# Patient Record
Sex: FEMALE | Race: WHITE | Employment: UNEMPLOYED | URBAN - METROPOLITAN AREA
[De-identification: names, ages, dates, MRNs, and addresses within clinical notes are randomized per-mention and may not be internally consistent; named-entity substitution may affect disease eponyms.]

---

## 2017-01-09 ENCOUNTER — TELEPHONE (OUTPATIENT)
Dept: SURGERY | Age: 77
End: 2017-01-09

## 2017-01-09 PROBLEM — T82.590A DIALYSIS AV FISTULA MALFUNCTION (HCC): Status: ACTIVE | Noted: 2017-01-09

## 2017-01-20 ENCOUNTER — OFFICE VISIT (OUTPATIENT)
Dept: SURGERY | Age: 77
End: 2017-01-20

## 2017-01-20 VITALS
SYSTOLIC BLOOD PRESSURE: 106 MMHG | DIASTOLIC BLOOD PRESSURE: 57 MMHG | HEIGHT: 62 IN | HEART RATE: 69 BPM | BODY MASS INDEX: 20.24 KG/M2 | WEIGHT: 110 LBS

## 2017-01-20 DIAGNOSIS — T82.7XXD VASCULAR GRAFT INFECTION, SUBSEQUENT ENCOUNTER: Primary | ICD-10-CM

## 2017-01-20 PROCEDURE — 99024 POSTOP FOLLOW-UP VISIT: CPT | Performed by: NURSE PRACTITIONER

## 2017-01-23 ENCOUNTER — TELEPHONE (OUTPATIENT)
Dept: SURGERY | Age: 77
End: 2017-01-23

## 2017-01-27 ENCOUNTER — OFFICE VISIT (OUTPATIENT)
Dept: SURGERY | Age: 77
End: 2017-01-27

## 2017-01-27 VITALS
SYSTOLIC BLOOD PRESSURE: 116 MMHG | HEIGHT: 62 IN | TEMPERATURE: 97.3 F | BODY MASS INDEX: 19.69 KG/M2 | HEART RATE: 77 BPM | WEIGHT: 107 LBS | DIASTOLIC BLOOD PRESSURE: 58 MMHG

## 2017-01-27 DIAGNOSIS — N18.6 END-STAGE RENAL DISEASE ON HEMODIALYSIS (HCC): Chronic | ICD-10-CM

## 2017-01-27 DIAGNOSIS — T82.7XXD VASCULAR GRAFT INFECTION, SUBSEQUENT ENCOUNTER: Primary | ICD-10-CM

## 2017-01-27 DIAGNOSIS — Z99.2 END-STAGE RENAL DISEASE ON HEMODIALYSIS (HCC): Chronic | ICD-10-CM

## 2017-01-27 PROCEDURE — 99024 POSTOP FOLLOW-UP VISIT: CPT | Performed by: SURGERY

## 2017-01-27 ASSESSMENT — ENCOUNTER SYMPTOMS
GASTROINTESTINAL NEGATIVE: 1
RESPIRATORY NEGATIVE: 1
ALLERGIC/IMMUNOLOGIC NEGATIVE: 1

## 2017-02-03 ENCOUNTER — OFFICE VISIT (OUTPATIENT)
Dept: SURGERY | Age: 77
End: 2017-02-03

## 2017-02-03 VITALS
HEART RATE: 79 BPM | HEIGHT: 62 IN | SYSTOLIC BLOOD PRESSURE: 128 MMHG | BODY MASS INDEX: 20.06 KG/M2 | WEIGHT: 109 LBS | DIASTOLIC BLOOD PRESSURE: 58 MMHG

## 2017-02-03 DIAGNOSIS — T82.7XXD VASCULAR GRAFT INFECTION, SUBSEQUENT ENCOUNTER: Primary | ICD-10-CM

## 2017-02-03 PROCEDURE — 99024 POSTOP FOLLOW-UP VISIT: CPT | Performed by: NURSE PRACTITIONER

## 2017-02-08 ENCOUNTER — TELEPHONE (OUTPATIENT)
Dept: SURGERY | Age: 77
End: 2017-02-08

## 2017-02-10 ENCOUNTER — OFFICE VISIT (OUTPATIENT)
Dept: SURGERY | Age: 77
End: 2017-02-10

## 2017-02-10 VITALS
HEIGHT: 62 IN | HEART RATE: 68 BPM | WEIGHT: 108 LBS | BODY MASS INDEX: 19.88 KG/M2 | SYSTOLIC BLOOD PRESSURE: 108 MMHG | DIASTOLIC BLOOD PRESSURE: 62 MMHG

## 2017-02-10 DIAGNOSIS — T82.7XXD VASCULAR GRAFT INFECTION, SUBSEQUENT ENCOUNTER: Primary | ICD-10-CM

## 2017-02-10 PROCEDURE — 99024 POSTOP FOLLOW-UP VISIT: CPT | Performed by: SURGERY

## 2017-02-10 ASSESSMENT — ENCOUNTER SYMPTOMS
ALLERGIC/IMMUNOLOGIC NEGATIVE: 1
GASTROINTESTINAL NEGATIVE: 1
SHORTNESS OF BREATH: 1

## 2017-02-20 ENCOUNTER — OFFICE VISIT (OUTPATIENT)
Dept: SURGERY | Age: 77
End: 2017-02-20

## 2017-02-20 VITALS
WEIGHT: 109 LBS | SYSTOLIC BLOOD PRESSURE: 122 MMHG | BODY MASS INDEX: 20.06 KG/M2 | DIASTOLIC BLOOD PRESSURE: 68 MMHG | HEIGHT: 62 IN | HEART RATE: 78 BPM

## 2017-02-20 DIAGNOSIS — N18.6 END-STAGE RENAL DISEASE ON HEMODIALYSIS (HCC): Chronic | ICD-10-CM

## 2017-02-20 DIAGNOSIS — Z99.2 END-STAGE RENAL DISEASE ON HEMODIALYSIS (HCC): Chronic | ICD-10-CM

## 2017-02-20 DIAGNOSIS — T82.9XXA COMPLICATION OF VASCULAR ACCESS FOR DIALYSIS, INITIAL ENCOUNTER: Primary | ICD-10-CM

## 2017-02-20 PROCEDURE — 99024 POSTOP FOLLOW-UP VISIT: CPT | Performed by: NURSE PRACTITIONER

## 2017-02-27 ENCOUNTER — TELEPHONE (OUTPATIENT)
Dept: SURGERY | Age: 77
End: 2017-02-27

## 2017-02-28 ENCOUNTER — OFFICE VISIT (OUTPATIENT)
Dept: SURGERY | Age: 77
End: 2017-02-28

## 2017-02-28 VITALS
BODY MASS INDEX: 19.94 KG/M2 | HEART RATE: 72 BPM | DIASTOLIC BLOOD PRESSURE: 66 MMHG | SYSTOLIC BLOOD PRESSURE: 145 MMHG | WEIGHT: 109 LBS

## 2017-02-28 DIAGNOSIS — N18.6 END-STAGE RENAL DISEASE ON HEMODIALYSIS (HCC): Primary | Chronic | ICD-10-CM

## 2017-02-28 DIAGNOSIS — T82.9XXD COMPLICATION OF VASCULAR ACCESS FOR DIALYSIS, SUBSEQUENT ENCOUNTER: ICD-10-CM

## 2017-02-28 DIAGNOSIS — Z99.2 END-STAGE RENAL DISEASE ON HEMODIALYSIS (HCC): Primary | Chronic | ICD-10-CM

## 2017-02-28 PROCEDURE — 99024 POSTOP FOLLOW-UP VISIT: CPT | Performed by: SURGERY

## 2017-02-28 ASSESSMENT — ENCOUNTER SYMPTOMS
GASTROINTESTINAL NEGATIVE: 1
RESPIRATORY NEGATIVE: 1
ALLERGIC/IMMUNOLOGIC NEGATIVE: 1
BACK PAIN: 0

## 2017-03-01 ENCOUNTER — TELEPHONE (OUTPATIENT)
Dept: SURGERY | Age: 77
End: 2017-03-01

## 2017-03-13 ENCOUNTER — PROCEDURE VISIT (OUTPATIENT)
Dept: SURGERY | Age: 77
End: 2017-03-13

## 2017-03-13 DIAGNOSIS — T82.9XXD COMPLICATION OF VASCULAR ACCESS FOR DIALYSIS, SUBSEQUENT ENCOUNTER: ICD-10-CM

## 2017-03-13 DIAGNOSIS — Z99.2 END-STAGE RENAL DISEASE ON HEMODIALYSIS (HCC): Chronic | ICD-10-CM

## 2017-03-13 DIAGNOSIS — N18.6 END-STAGE RENAL DISEASE ON HEMODIALYSIS (HCC): Chronic | ICD-10-CM

## 2017-03-13 PROCEDURE — 93990 DOPPLER FLOW TESTING: CPT | Performed by: SURGERY

## 2017-03-14 ENCOUNTER — OFFICE VISIT (OUTPATIENT)
Dept: SURGERY | Age: 77
End: 2017-03-14

## 2017-03-14 VITALS
HEART RATE: 63 BPM | WEIGHT: 108 LBS | SYSTOLIC BLOOD PRESSURE: 135 MMHG | HEIGHT: 62 IN | DIASTOLIC BLOOD PRESSURE: 64 MMHG | BODY MASS INDEX: 19.88 KG/M2

## 2017-03-14 DIAGNOSIS — N18.6 ESRD (END STAGE RENAL DISEASE) (HCC): Primary | ICD-10-CM

## 2017-03-14 PROCEDURE — 99024 POSTOP FOLLOW-UP VISIT: CPT | Performed by: SURGERY

## 2017-03-14 ASSESSMENT — ENCOUNTER SYMPTOMS
ALLERGIC/IMMUNOLOGIC NEGATIVE: 1
BACK PAIN: 0
GASTROINTESTINAL NEGATIVE: 1

## 2018-01-02 ENCOUNTER — OFFICE VISIT (OUTPATIENT)
Dept: SURGERY | Age: 78
End: 2018-01-02

## 2018-01-02 ENCOUNTER — PROCEDURE VISIT (OUTPATIENT)
Dept: SURGERY | Age: 78
End: 2018-01-02

## 2018-01-02 VITALS
HEART RATE: 59 BPM | HEIGHT: 62 IN | SYSTOLIC BLOOD PRESSURE: 96 MMHG | WEIGHT: 108 LBS | BODY MASS INDEX: 19.88 KG/M2 | DIASTOLIC BLOOD PRESSURE: 50 MMHG

## 2018-01-02 DIAGNOSIS — N18.6 END-STAGE RENAL DISEASE ON HEMODIALYSIS (HCC): Primary | Chronic | ICD-10-CM

## 2018-01-02 DIAGNOSIS — R41.82 ALTERED MENTAL STATUS, UNSPECIFIED ALTERED MENTAL STATUS TYPE: ICD-10-CM

## 2018-01-02 DIAGNOSIS — I48.0 PAF (PAROXYSMAL ATRIAL FIBRILLATION) (HCC): ICD-10-CM

## 2018-01-02 DIAGNOSIS — I27.20 PULMONARY HYPERTENSION (HCC): Chronic | ICD-10-CM

## 2018-01-02 DIAGNOSIS — Z79.01 WARFARIN ANTICOAGULATION: Chronic | ICD-10-CM

## 2018-01-02 DIAGNOSIS — I48.92 ATRIAL FLUTTER, UNSPECIFIED TYPE (HCC): ICD-10-CM

## 2018-01-02 DIAGNOSIS — Z99.2 END-STAGE RENAL DISEASE ON HEMODIALYSIS (HCC): Primary | Chronic | ICD-10-CM

## 2018-01-02 DIAGNOSIS — E78.5 HYPERLIPIDEMIA, UNSPECIFIED HYPERLIPIDEMIA TYPE: ICD-10-CM

## 2018-01-02 DIAGNOSIS — Z86.79 HISTORY OF ATRIAL FIBRILLATION: Chronic | ICD-10-CM

## 2018-01-02 DIAGNOSIS — I10 ESSENTIAL HYPERTENSION: ICD-10-CM

## 2018-01-02 PROCEDURE — G0365 VESSEL MAPPING HEMO ACCESS: HCPCS | Performed by: SURGERY

## 2018-01-02 PROCEDURE — 99214 OFFICE O/P EST MOD 30 MIN: CPT | Performed by: SURGERY

## 2018-01-02 ASSESSMENT — ENCOUNTER SYMPTOMS
EYE ITCHING: 0
ALLERGIC/IMMUNOLOGIC NEGATIVE: 1
BACK PAIN: 0
EYE REDNESS: 0
PHOTOPHOBIA: 0
EYE DISCHARGE: 0
GASTROINTESTINAL NEGATIVE: 1
EYE PAIN: 0

## 2018-01-02 NOTE — PROGRESS NOTES
Daily Progress Note   Gabriela Ballard MD      1/2/2018    Chief Complaint   Patient presents with    Other     Hemodyalysis graft on right arm not working. Patient had left side cath done today 01-2. HISTORY OF PRESENT ILLNESS:                The patient is a 65 y.o. female who presents with concerns of non-functiong Right Upper Extremity Graft. Patient indicates she had been advised she has a narrowing to the Rt upper Extremity great. Patient has had a new CVC placed today of the Left side, with the Rt side CVC removed, she had this performed at the [address]. Patient is Left handed. Patient states since March of 2017, the access Center has mad an attempt to open, she mentions this had been performed. Patient has never had vascular access through the groins. Patient does continue use of Oxygen daily. Past Medical History:   Diagnosis Date    Anemia     Atrial fibrillation ([address]     Chronic kidney disease     Clostridium difficile infection 10/2/14, 4/3/15    Dialysis patient ([address]     End-stage renal disease on hemodialysis ([address]     GERD (gastroesophageal reflux disease)     Hemodialysis patient ([address]     3 times a week for 4 hours    Hyperlipidemia     Hypertension     Hypothyroidism     Ulcer ([address]     bleeding       Past Surgical History:   Procedure Laterality Date    APPENDECTOMY      BACK SURGERY      DIALYSIS FISTULA CREATION  1/4/11    Right arm radiocephalic av fistula         DIALYSIS FISTULA CREATION  12/4/12    Right arm brachial/basilic vein transposition     DIALYSIS FISTULA CREATION Right 12/30/14    right upper arm loop graft fistula, brachial basilic using acuseal graft choice    HYSTERECTOMY      OTHER SURGICAL HISTORY Right 9/3/15    Revision of right upper arm dialysis graft; Removal of infected graft and replacement with 6 mm Standard wall stretch Goretex graft - brachial axillary;  Left internal jugular tunneled dialysis catheter; fluoroscopy and ultrasound guidance     URETER SURGERY         Social History     Social History    Marital status:      Spouse name: N/A    Number of children: N/A    Years of education: N/A     Occupational History    Not on file. Social History Main Topics    Smoking status: Never Smoker    Smokeless tobacco: Never Used    Alcohol use No    Drug use: No    Sexual activity: Yes     Partners: Male     Other Topics Concern    Not on file     Social History Narrative    No narrative on file       No family history on file. Current Outpatient Prescriptions:     Wound Dressings (GRX HYDROGEL GAUZE 4X4) PADS, Apply 1 Package topically daily, Disp: 2 each, Rfl: 2    ceFAZolin (ANCEF) 1 G injection, 2g IV on HD for 6 weeks  given  by dialysis center, Disp: 2000 mg, Rfl: 0    oxyCODONE-acetaminophen (PERCOCET) 5-325 MG per tablet, Take 1 tablet by mouth every 8 hours as needed for Pain ., Disp: 20 tablet, Rfl: 0    amiodarone (CORDARONE) 200 MG tablet, Take 200 mg by mouth daily, Disp: , Rfl:     warfarin (COUMADIN) 4 MG tablet, Take 0.5 tablets by mouth daily, Disp: 30 tablet, Rfl: 3    Magic Mouthwash (MIRACLE MOUTHWASH), Swish and spit 5 mLs 4 times daily as needed for Irritation, Disp: 120 mL, Rfl: 0    lisinopril (PRINIVIL;ZESTRIL) 20 MG tablet, Take 0.5 tablets by mouth daily, Disp: 30 tablet, Rfl: 3    hydrocortisone-pramoxine (PROCTOFOAM HC) 1-1 % rectal foam, Place rectally 2 times daily. , Disp: 10 g, Rfl: 0    acetaminophen 650 MG TABS, Take 650 mg by mouth every 4 hours as needed, Disp: 120 tablet, Rfl: 3    FLUoxetine (PROZAC) 10 MG tablet, Take 10 mg by mouth daily, Disp: , Rfl:     epoetin elvin (EPOGEN;PROCRIT) 05839 UNIT/ML injection, Inject 10,000 Units into the skin three times a week, Disp: , Rfl:     levothyroxine (SYNTHROID) 112 MCG tablet, Take 1 tablet by mouth every morning (before breakfast). , Disp: 30 tablet, Rfl: 3    sevelamer (RENVELA) 800 MG tablet, Take 2 tablets by mouth 3 times daily With meals, Disp: , Rfl:     ergocalciferol (ERGOCALCIFEROL) 03877 UNITS capsule, Take 50,000 Units by mouth once a week SATURDAYS, Disp: , Rfl:     ferrous sulfate 325 (65 FE) MG EC tablet, Take 142 mg by mouth daily (with breakfast) , Disp: , Rfl:     folic acid (FOLVITE) 1 MG tablet, Take 1 mg by mouth daily. , Disp: , Rfl:     b complex-C-folic acid (NEPHROCAPS) 1 MG capsule, Take 1 capsule by mouth daily.   , Disp: , Rfl:     omeprazole (PRILOSEC) 20 MG capsule, Take 40 mg by mouth 2 times daily , Disp: , Rfl:     Hydrochlorothiazide    Vitals:    01/02/18 1507   BP: (!) 96/50   Site: Left Arm   Position: Sitting   Cuff Size: Small Adult   Pulse: 59   Weight: 108 lb (49 kg)   Height: 5' 2\" (1.575 m)       Admission on 01/09/2017, Discharged on 01/14/2017   Component Date Value Ref Range Status    WBC 01/09/2017 4.3  4.0 - 11.0 K/uL Final    RBC 01/09/2017 2.83* 4.00 - 5.20 M/uL Final    Hemoglobin 01/09/2017 7.6* 12.0 - 16.0 g/dL Final    Hematocrit 01/09/2017 25.6* 36.0 - 48.0 % Final    MCV 01/09/2017 90.2  80.0 - 100.0 fL Final    MCH 01/09/2017 26.9  26.0 - 34.0 pg Final    MCHC 01/09/2017 29.9* 31.0 - 36.0 g/dL Final    RDW 01/09/2017 18.2* 12.4 - 15.4 % Final    Platelets 92/80/7511 217  135 - 450 K/uL Final    MPV 01/09/2017 6.9  5.0 - 10.5 fL Final    Neutrophils % 01/09/2017 77.6  % Final    Lymphocytes % 01/09/2017 9.1  % Final    Monocytes % 01/09/2017 11.9  % Final    Eosinophils % 01/09/2017 0.7  % Final    Basophils % 01/09/2017 0.7  % Final    Neutrophils # 01/09/2017 3.3  1.7 - 7.7 K/uL Final    Lymphocytes # 01/09/2017 0.4* 1.0 - 5.1 K/uL Final    Monocytes # 01/09/2017 0.5  0.0 - 1.3 K/uL Final    Eosinophils # 01/09/2017 0.0  0.0 - 0.6 K/uL Final    Basophils # 01/09/2017 0.0  0.0 - 0.2 K/uL Final    Protime 01/09/2017 12.7  9.8 - 13.0 sec Final    Comment: Effective 10-29-15 10:00am EST  Please note reference ranges Chronic Kidney Disease: less than 60 ml/min/1.73 sq.m. Kidney Failure: less than 15 ml/min/1.73 sq.m. Results valid for patients 18 years and older.  Calcium 01/11/2017 7.8* 8.3 - 10.6 mg/dL Final    Phosphorus 01/11/2017 5.4* 2.5 - 4.9 mg/dL Final    Alb 01/11/2017 2.0* 3.4 - 5.0 g/dL Final    Vancomycin Rm 01/11/2017 22.0* ug/mL Final    Comment: Trough - 10-20  Toxic  - >20.0      Gram Stain Result 01/16/2017 *  Final                    Value:1+ Gram positive cocci  1+ WBC's (Polymorphonuclear)      Anaerobic Culture 01/16/2017 No anaerobes isolated   Final    Organism 01/16/2017 Staphylococcus aureus*  Final    WOUND/ABSCESS 01/16/2017 Moderate growth   Final    WBC 01/12/2017 4.2  4.0 - 11.0 K/uL Final    RBC 01/12/2017 2.83* 4.00 - 5.20 M/uL Final    Hemoglobin 01/12/2017 8.2* 12.0 - 16.0 g/dL Final    Hematocrit 01/12/2017 25.8* 36.0 - 48.0 % Final    MCV 01/12/2017 91.1  80.0 - 100.0 fL Final    MCH 01/12/2017 28.9  26.0 - 34.0 pg Final    MCHC 01/12/2017 31.7  31.0 - 36.0 g/dL Final    RDW 01/12/2017 17.1* 12.4 - 15.4 % Final    Platelets 76/48/0165 206  135 - 450 K/uL Final    MPV 01/12/2017 6.9  5.0 - 10.5 fL Final    Neutrophils % 01/12/2017 86.0  % Final    Lymphocytes % 01/12/2017 7.4  % Final    Monocytes % 01/12/2017 6.2  % Final    Eosinophils % 01/12/2017 0.1  % Final    Basophils % 01/12/2017 0.3  % Final    Neutrophils # 01/12/2017 3.6  1.7 - 7.7 K/uL Final    Lymphocytes # 01/12/2017 0.3* 1.0 - 5.1 K/uL Final    Monocytes # 01/12/2017 0.3  0.0 - 1.3 K/uL Final    Eosinophils # 01/12/2017 0.0  0.0 - 0.6 K/uL Final    Basophils # 01/12/2017 0.0  0.0 - 0.2 K/uL Final    Vancomycin Tr 01/12/2017 14.2  10.0 - 20.0 ug/mL Final    Comment: Trough - 10-20  Toxic  - >20.0      Blood Culture, Routine 01/17/2017 No growth after 5 days of incubation.    Final    Sodium 01/13/2017 135* 136 - 145 mmol/L Final    Potassium 01/13/2017 4.5  3.5 - 5.1 mmol/L Final    Chloride 01/13/2017 93* 99 - 110 mmol/L Final    CO2 01/13/2017 29  21 - 32 mmol/L Final    Anion Gap 01/13/2017 13  3 - 16 Final    Glucose 01/13/2017 90  70 - 99 mg/dL Final    BUN 01/13/2017 20  7 - 20 mg/dL Final    CREATININE 01/13/2017 4.2* 0.6 - 1.2 mg/dL Final    GFR Non- 01/13/2017 10* >60 Final    Comment: >60 mL/min/1.73m2 EGFR, calc. for ages 25 and older using the  MDRD formula (not corrected for weight), is valid for stable  renal function.  GFR  01/13/2017 12* >60 Final    Comment: Chronic Kidney Disease: less than 60 ml/min/1.73 sq.m. Kidney Failure: less than 15 ml/min/1.73 sq.m. Results valid for patients 18 years and older.       Calcium 01/13/2017 7.8* 8.3 - 10.6 mg/dL Final    Phosphorus 01/13/2017 4.4  2.5 - 4.9 mg/dL Final    Alb 01/13/2017 2.4* 3.4 - 5.0 g/dL Final    WBC 01/13/2017 4.7  4.0 - 11.0 K/uL Final    RBC 01/13/2017 2.91* 4.00 - 5.20 M/uL Final    Hemoglobin 01/13/2017 8.3* 12.0 - 16.0 g/dL Final    Hematocrit 01/13/2017 26.7* 36.0 - 48.0 % Final    MCV 01/13/2017 91.8  80.0 - 100.0 fL Final    MCH 01/13/2017 28.4  26.0 - 34.0 pg Final    MCHC 01/13/2017 31.0  31.0 - 36.0 g/dL Final    RDW 01/13/2017 17.3* 12.4 - 15.4 % Final    Platelets 92/57/8500 241  135 - 450 K/uL Final    MPV 01/13/2017 6.5  5.0 - 10.5 fL Final    Neutrophils % 01/13/2017 71.1  % Final    Lymphocytes % 01/13/2017 14.6  % Final    Monocytes % 01/13/2017 12.4  % Final    Eosinophils % 01/13/2017 1.5  % Final    Basophils % 01/13/2017 0.4  % Final    Neutrophils # 01/13/2017 3.4  1.7 - 7.7 K/uL Final    Lymphocytes # 01/13/2017 0.7* 1.0 - 5.1 K/uL Final    Monocytes # 01/13/2017 0.6  0.0 - 1.3 K/uL Final    Eosinophils # 01/13/2017 0.1  0.0 - 0.6 K/uL Final    Basophils # 01/13/2017 0.0  0.0 - 0.2 K/uL Final    Protime 01/13/2017 11.9  9.8 - 13.0 sec Final    Comment: Effective 10-29-15 10:00am EST  Please note reference ranges have  changed for PT and INR Testing.  INR 01/13/2017 1.04  0.85 - 1.16 Final    Comment: Effective 07/29/2014 at 10am EST    Normal: 0.85 - 1.16  Therapeutic: 2.0 - 3.0  Pros. Valve: 2.5 - 3.5  AMI: 2.0 - 3.0      Sed Rate 01/13/2017 32* 0 - 30 mm/Hr Final    CRP 01/13/2017 41.7* 0.0 - 5.1 mg/L Final    Comment: [facility]-CRP Reference Range:  0D-29D      <0.6  30D-199Y    <5.1    CRP is used in the detection and evaluation of infection, tissue injury,  inflammatory disorders and associated diseases. Increases in CRP values  are non-specific and should not be interpreted without a complete  clinical evaluation.  Vancomycin Rm 01/13/2017 21.4* ug/mL Final    Comment: Trough - 10-20  Toxic  - >20.0      Culture, Blood 2 01/18/2017 No growth after 5 days of incubation. Final    WBC 01/14/2017 4.2  4.0 - 11.0 K/uL Final    RBC 01/14/2017 2.75* 4.00 - 5.20 M/uL Final    Hemoglobin 01/14/2017 7.8* 12.0 - 16.0 g/dL Final    Hematocrit 01/14/2017 25.6* 36.0 - 48.0 % Final    MCV 01/14/2017 92.9  80.0 - 100.0 fL Final    MCH 01/14/2017 28.3  26.0 - 34.0 pg Final    MCHC 01/14/2017 30.5* 31.0 - 36.0 g/dL Final    RDW 01/14/2017 17.6* 12.4 - 15.4 % Final    Platelets 00/14/6031 197  135 - 450 K/uL Final    MPV 01/14/2017 6.4  5.0 - 10.5 fL Final    Neutrophils % 01/14/2017 72.0  % Final    Lymphocytes % 01/14/2017 12.4  % Final    Monocytes % 01/14/2017 12.2  % Final    Eosinophils % 01/14/2017 2.7  % Final    Basophils % 01/14/2017 0.7  % Final    Neutrophils # 01/14/2017 3.0  1.7 - 7.7 K/uL Final    Lymphocytes # 01/14/2017 0.5* 1.0 - 5.1 K/uL Final    Monocytes # 01/14/2017 0.5  0.0 - 1.3 K/uL Final    Eosinophils # 01/14/2017 0.1  0.0 - 0.6 K/uL Final    Basophils # 01/14/2017 0.0  0.0 - 0.2 K/uL Final    Protime 01/14/2017 16.0* 9.8 - 13.0 sec Final    Comment: Effective 10-29-15 10:00am EST  Please note reference ranges have  changed for PT and INR Testing.       INR 01/14/2017 1.39* 0.85 - 1.16 Final    Comment: Effective 07/29/2014 at 10am EST    Normal: 0.85 - 1.16  Therapeutic: 2.0 - 3.0  Pros. Valve: 2.5 - 3.5  AMI: 2.0 - 3.0         Review of Systems   Constitutional: Negative. HENT: Negative. Eyes: Positive for visual disturbance ( Wears eye glasses). Negative for photophobia, pain, discharge, redness and itching. Respiratory:        Patient is using O2    Cardiovascular: Negative. Gastrointestinal: Negative. Endocrine: Negative. Genitourinary: Negative. Musculoskeletal: Positive for gait problem (Wheelchair use). Negative for arthralgias, back pain, joint swelling, neck pain and neck stiffness. Skin: Negative. Allergic/Immunologic: Negative. Hematological: Negative. Psychiatric/Behavioral: Negative. Physical Exam   Cardiovascular: Normal rate. An irregular rhythm present. Murmur heard. Pulses:       Carotid pulses are 2+ on the right side, and 2+ on the left side. Doppler 1/2/2018:  Radial: Biphasic   Ulnar: Monophasic    Velázquez: Monophasic    Pulmonary/Chest:       Musculoskeletal:        Arms:  Skin:   Rt Gor-William wound measurement 2/10/2017:  8mm x 2mm    Rt Tunnel measurement 1/27/2017:  1.4cm x 6.2 cm  Depth: 11mm (proximal tunnel)         ASSESSMENT:    Problem List Items Addressed This Visit     End-stage renal disease on hemodialysis - Primary (Chronic)    Warfarin anticoagulation (Chronic)    History of atrial fibrillation (Chronic)    Pulmonary hypertension (Chronic)    Hyperlipidemia    Hypertension    Altered mental status    Atrial flutter (HCC)    PAF (paroxysmal atrial fibrillation) ([facility]      Other Visit Diagnoses    None. PLAN:  Patient has been advised we will request the Report of the studies which has been performed with [facility]. Patient has been advised that she would require a Left side Vein Mapping performed which can be performed today.  I think a 2 stage procedure to a borderline basilic vein would

## 2018-03-09 ENCOUNTER — TELEPHONE (OUTPATIENT)
Dept: SURGERY | Age: 78
End: 2018-03-09

## 2018-03-09 NOTE — TELEPHONE ENCOUNTER
Returned the call to Sallie with [facility], she mentions the patient requested a Thursday Sx date, she has been offered the following date of 4/26, Sallie states this date is acceptable, she also mentons the patient has completed an H&P while at dialysis, Sallie states she will fax the report to our office.

## 2018-03-09 NOTE — TELEPHONE ENCOUNTER
Pt saw dr Yajaira Bacon in [place] with an infection and the access sx was not done does dr Yajaira Bacon want to do the access sx now or not what is the plan?  Please call norman miller

## 2018-04-04 ENCOUNTER — TELEPHONE (OUTPATIENT)
Dept: SURGERY | Age: 78
End: 2018-04-04

## 2020-11-03 PROBLEM — I10 HYPERTENSION: Status: RESOLVED | Noted: 2020-11-03 | Resolved: 2020-11-03
